# Patient Record
Sex: MALE | Race: BLACK OR AFRICAN AMERICAN | Employment: FULL TIME | URBAN - METROPOLITAN AREA
[De-identification: names, ages, dates, MRNs, and addresses within clinical notes are randomized per-mention and may not be internally consistent; named-entity substitution may affect disease eponyms.]

---

## 2017-06-28 ENCOUNTER — HOSPITAL ENCOUNTER (EMERGENCY)
Age: 52
Discharge: HOME | End: 2017-06-28
Attending: EMERGENCY MEDICINE
Payer: SELF-PAY

## 2017-06-28 VITALS
TEMPERATURE: 97.7 F | HEART RATE: 82 BPM | DIASTOLIC BLOOD PRESSURE: 104 MMHG | RESPIRATION RATE: 20 BRPM | HEIGHT: 69 IN | BODY MASS INDEX: 33.47 KG/M2 | WEIGHT: 226 LBS | OXYGEN SATURATION: 97 % | SYSTOLIC BLOOD PRESSURE: 158 MMHG

## 2017-06-28 DIAGNOSIS — I10 ESSENTIAL HYPERTENSION: Primary | ICD-10-CM

## 2017-06-28 PROCEDURE — 74011250637 HC RX REV CODE- 250/637: Performed by: NURSE PRACTITIONER

## 2017-06-28 PROCEDURE — 99283 EMERGENCY DEPT VISIT LOW MDM: CPT

## 2017-06-28 RX ORDER — LISINOPRIL 10 MG/1
10 TABLET ORAL
Status: COMPLETED | OUTPATIENT
Start: 2017-06-28 | End: 2017-06-28

## 2017-06-28 RX ADMIN — LISINOPRIL 10 MG: 10 TABLET ORAL at 15:41

## 2017-06-28 NOTE — ED NOTES
Bedside and Verbal shift change report given to Melissa Neely RN (oncoming nurse) by Luís Isbell RN (offgoing nurse). Report given with SBAR, Kardex, Intake/Output, MAR and Recent Results.

## 2017-06-28 NOTE — DISCHARGE INSTRUCTIONS
We hope that we have addressed all of your medical concerns. The examination and treatment you received in the Emergency Department were for an emergent problem and were not intended as complete care. It is important that you follow up with your healthcare provider(s) for ongoing care. If your symptoms worsen or do not improve as expected, and you are unable to reach your usual health care provider(s), you should return to the Emergency Department. Today's healthcare is undergoing tremendous change, and patient satisfaction surveys are one of the many tools to assess the quality of medical care. You may receive a survey from the [facility] regarding your experience in the Emergency Department. I hope that your experience has been completely positive, particularly the medical care that I provided. As such, please participate in the survey; anything less than excellent does not meet my expectations or intentions. [facility] and 8 [facility] participate in nationally recognized quality of care measures. If your blood pressure is greater than 120/80, as reported below, we urge that you seek medical care to address the potential of high blood pressure, commonly known as hypertension. Hypertension can be hereditary or can be caused by certain medical conditions, pain, stress, or \"white coat syndrome. \"       Please make an appointment with your health care provider(s) for follow up of your Emergency Department visit. VITALS:   Patient Vitals for the past 8 hrs:   Temp Pulse Resp BP SpO2   06/28/17 1615 - - - (!) 158/104 97 %   06/28/17 1533 - 82 - (!) 168/102 98 %   06/28/17 1508 97.7 °F (36.5 °C) 82 20 (!) 196/116 96 %          Thank you for allowing us to provide you with medical care today. We realize that you have many choices for your emergency care needs. Please choose us in the future for any continued health care needs. Emily Myers, [address]: [phone]            No results found for this or any previous visit (from the past 24 hour(s)). No results found. High Blood Pressure: Care Instructions  Your Care Instructions  If your blood pressure is usually above 140/90, you have high blood pressure, or hypertension. That means the top number is 140 or higher or the bottom number is 90 or higher, or both. Despite what a lot of people think, high blood pressure usually doesn't cause headaches or make you feel dizzy or lightheaded. It usually has no symptoms. But it does increase your risk for heart attack, stroke, and kidney or eye damage. The higher your blood pressure, the more your risk increases. Your doctor will give you a goal for your blood pressure. Your goal will be based on your health and your age. An example of a goal is to keep your blood pressure below 140/90. Lifestyle changes, such as eating healthy and being active, are always important to help lower blood pressure. You might also take medicine to reach your blood pressure goal.  Follow-up care is a key part of your treatment and safety. Be sure to make and go to all appointments, and call your doctor if you are having problems. It's also a good idea to know your test results and keep a list of the medicines you take. How can you care for yourself at home? Medical treatment  · If you stop taking your medicine, your blood pressure will go back up. You may take one or more types of medicine to lower your blood pressure. Be safe with medicines. Take your medicine exactly as prescribed. Call your doctor if you think you are having a problem with your medicine. · Talk to your doctor before you start taking aspirin every day. Aspirin can help certain people lower their risk of a heart attack or stroke.  But taking aspirin isn't right for everyone, because it can cause serious bleeding. · See your doctor regularly. You may need to see the doctor more often at first or until your blood pressure comes down. · If you are taking blood pressure medicine, talk to your doctor before you take decongestants or anti-inflammatory medicine, such as ibuprofen. Some of these medicines can raise blood pressure. · Learn how to check your blood pressure at home. Lifestyle changes  · Stay at a healthy weight. This is especially important if you put on weight around the waist. Losing even 10 pounds can help you lower your blood pressure. · If your doctor recommends it, get more exercise. Walking is a good choice. Bit by bit, increase the amount you walk every day. Try for at least 30 minutes on most days of the week. You also may want to swim, bike, or do other activities. · Avoid or limit alcohol. Talk to your doctor about whether you can drink any alcohol. · Try to limit how much sodium you eat to less than 2,300 milligrams (mg) a day. Your doctor may ask you to try to eat less than 1,500 mg a day. · Eat plenty of fruits (such as bananas and oranges), vegetables, legumes, whole grains, and low-fat dairy products. · Lower the amount of saturated fat in your diet. Saturated fat is found in animal products such as milk, cheese, and meat. Limiting these foods may help you lose weight and also lower your risk for heart disease. · Do not smoke. Smoking increases your risk for heart attack and stroke. If you need help quitting, talk to your doctor about stop-smoking programs and medicines. These can increase your chances of quitting for good. When should you call for help? Call 911 anytime you think you may need emergency care. This may mean having symptoms that suggest that your blood pressure is causing a serious heart or blood vessel problem. Your blood pressure may be over 180/110. For example, call 911 if:  · You have symptoms of a heart attack.  These may include:  ¨ Chest pain or pressure, or a strange feeling in the chest.  ¨ Sweating. ¨ Shortness of breath. ¨ Nausea or vomiting. ¨ Pain, pressure, or a strange feeling in the back, neck, jaw, or upper belly or in one or both shoulders or arms. ¨ Lightheadedness or sudden weakness. ¨ A fast or irregular heartbeat. · You have symptoms of a stroke. These may include:  ¨ Sudden numbness, tingling, weakness, or loss of movement in your face, arm, or leg, especially on only one side of your body. ¨ Sudden vision changes. ¨ Sudden trouble speaking. ¨ Sudden confusion or trouble understanding simple statements. ¨ Sudden problems with walking or balance. ¨ A sudden, severe headache that is different from past headaches. · You have severe back or belly pain. Do not wait until your blood pressure comes down on its own. Get help right away. Call your doctor now or seek immediate care if:  · Your blood pressure is much higher than normal (such as 180/110 or higher), but you don't have symptoms. · You think high blood pressure is causing symptoms, such as:  ¨ Severe headache. ¨ Blurry vision. Watch closely for changes in your health, and be sure to contact your doctor if:  · Your blood pressure measures 140/90 or higher at least 2 times. That means the top number is 140 or higher or the bottom number is 90 or higher, or both. · You think you may be having side effects from your blood pressure medicine. · Your blood pressure is usually normal, but it goes above normal at least 2 times. Where can you learn more? Go to http://lin-eli.info/. Enter F926 in the search box to learn more about \"High Blood Pressure: Care Instructions. \"  Current as of: August 8, 2016  Content Version: 11.3  © 1060-8655 [employer]). Care instructions adapted under license by [employer] (which disclaims liability or warranty for this information).  If you have questions about a medical condition or this instruction, always ask your healthcare professional. [place] [address] any warranty or liability for your use of this information.

## 2017-06-28 NOTE — ED TRIAGE NOTES
Pt arrives from his pain management physician for hypertension. Pt reports his pressure was 210/120 at appointment. /116 in triage.

## 2017-06-28 NOTE — ED PROVIDER NOTES
HPI Comments: Mercedez Anderson is a 46 y.o. male who presents ambulatory to the ED from his pain management clinic. He was sent to the emergency room due to his \"blood pressure being too high. \" Patient states he did not take his am blood pressure medication because he \"forgot. \" States he has pain down his leg like \"he normally does. \" 4/10. Patient denies headache, denies nausea, vomiting, fever chills. Patient asymptomatic regarding hypertension. PCP: None    PMHx significant for: Past Medical History:  No date: Hypertension    PSHx significant for: Past Surgical History:  No date: HX APPENDECTOMY    Social Hx: Tobacco: current everyday smoker EtOH: occasional Illicit drug use: none    There are no further complaints or symptoms at this time. The history is provided by the patient. Past Medical History:   Diagnosis Date    Hypertension        Past Surgical History:   Procedure Laterality Date    HX APPENDECTOMY           History reviewed. No pertinent family history. Social History     Social History    Marital status: SINGLE     Spouse name: N/A    Number of children: N/A    Years of education: N/A     Occupational History    Not on file. Social History Main Topics    Smoking status: Current Every Day Smoker     Packs/day: 1.00    Smokeless tobacco: Not on file    Alcohol use Yes      Comment: occ    Drug use: No    Sexual activity: Not on file     Other Topics Concern    Not on file     Social History Narrative         ALLERGIES: Review of patient's allergies indicates no known allergies. Review of Systems   Constitutional: Negative for activity change, appetite change, chills, fatigue and fever. HENT: Negative for congestion, ear pain, sinus pressure, sore throat, trouble swallowing and voice change. Eyes: Negative for photophobia, pain, redness and visual disturbance. Respiratory: Negative for chest tightness, shortness of breath and wheezing.     Cardiovascular: Negative for chest pain and palpitations. Gastrointestinal: Negative for abdominal distention, abdominal pain, nausea and vomiting. Endocrine: Negative. Genitourinary: Negative for difficulty urinating, frequency and urgency. Musculoskeletal: Positive for back pain (left hand side running down into his leg chronic pain). Negative for neck pain and neck stiffness. Skin: Negative for color change, rash and wound. Allergic/Immunologic: Negative. Neurological: Negative for dizziness, syncope, weakness and headaches. Hematological: Does not bruise/bleed easily. Psychiatric/Behavioral: Negative for behavioral problems. The patient is not nervous/anxious. Vitals:    06/28/17 1508   BP: (!) 196/116   Pulse: 82   Resp: 20   Temp: 97.7 °F (36.5 °C)   SpO2: 96%   Weight: 102.5 kg (226 lb)   Height: 5' 9\" (1.753 m)            Physical Exam   Constitutional: He is oriented to person, place, and time. He appears well-developed and well-nourished. No distress. HENT:   Head: Normocephalic and atraumatic. Right Ear: External ear normal.   Left Ear: External ear normal.   Nose: Nose normal.   Mouth/Throat: Oropharynx is clear and moist.   Eyes: Conjunctivae and EOM are normal. Pupils are equal, round, and reactive to light. Right eye exhibits no discharge. Left eye exhibits no discharge. Neck: Normal range of motion. Neck supple. No JVD present. Cardiovascular: Normal rate, regular rhythm, normal heart sounds and intact distal pulses. No murmur heard. Pulmonary/Chest: Effort normal and breath sounds normal. No respiratory distress. He exhibits no tenderness. Abdominal: Soft. Bowel sounds are normal. He exhibits no distension. There is no tenderness. Genitourinary:   Genitourinary Comments: Voids without difficulty   Musculoskeletal: Normal range of motion. He exhibits no edema or deformity. Neurological: He is alert and oriented to person, place, and time. Skin: Skin is warm and dry. Psychiatric: He has a normal mood and affect. His behavior is normal. Judgment and thought content normal.   Nursing note and vitals reviewed. MDM  Number of Diagnoses or Management Options  Essential hypertension: established and improving  Diagnosis management comments: Discharge to home and follow up with PCP.        Amount and/or Complexity of Data Reviewed  Discuss the patient with other providers: yes (Discussed plan of care with Dr. Kimberly Sy.)      ED Course       Procedures

## 2021-03-22 ENCOUNTER — HOSPITAL ENCOUNTER (EMERGENCY)
Age: 56
Discharge: HOME | End: 2021-03-22
Attending: EMERGENCY MEDICINE

## 2021-03-22 ENCOUNTER — APPOINTMENT (OUTPATIENT)
Dept: GENERAL RADIOLOGY | Age: 56
End: 2021-03-22
Attending: EMERGENCY MEDICINE

## 2021-03-22 VITALS
HEIGHT: 69 IN | SYSTOLIC BLOOD PRESSURE: 145 MMHG | HEART RATE: 89 BPM | OXYGEN SATURATION: 95 % | TEMPERATURE: 98.2 F | BODY MASS INDEX: 39.18 KG/M2 | WEIGHT: 264.55 LBS | DIASTOLIC BLOOD PRESSURE: 93 MMHG | RESPIRATION RATE: 21 BRPM

## 2021-03-22 DIAGNOSIS — M10.9 ACUTE GOUT INVOLVING TOE OF LEFT FOOT, UNSPECIFIED CAUSE: Primary | ICD-10-CM

## 2021-03-22 PROCEDURE — 99283 EMERGENCY DEPT VISIT LOW MDM: CPT

## 2021-03-22 PROCEDURE — 73660 X-RAY EXAM OF TOE(S): CPT

## 2021-03-22 PROCEDURE — 74011250637 HC RX REV CODE- 250/637: Performed by: EMERGENCY MEDICINE

## 2021-03-22 RX ORDER — COLCHICINE 0.6 MG/1
0.6 TABLET ORAL
Status: COMPLETED | OUTPATIENT
Start: 2021-03-22 | End: 2021-03-22

## 2021-03-22 RX ORDER — COLCHICINE 0.6 MG/1
0.6 TABLET ORAL DAILY
Qty: 1 TAB | Refills: 0 | Status: SHIPPED | OUTPATIENT
Start: 2021-03-22 | End: 2021-03-23

## 2021-03-22 RX ORDER — HYDROCHLOROTHIAZIDE 25 MG/1
TABLET ORAL DAILY
COMMUNITY

## 2021-03-22 RX ADMIN — COLCHICINE 0.6 MG: 0.6 TABLET, FILM COATED ORAL at 16:43

## 2021-03-22 NOTE — ED TRIAGE NOTES
TRIAGE NOTE: Patient arrived from home with c/o LEFT big toe pain after stubbing his foot 3 weeks ago. \"it just hasnt gotten better, I was worried about it. \"

## 2021-03-22 NOTE — ED PROVIDER NOTES
The history is provided by the patient. Foot Pain   This is a new problem. The current episode started more than 1 week ago. The problem occurs constantly. The problem has not changed since onset. The pain is present in the left toes. The quality of the pain is described as aching. The pain is moderate. Associated symptoms include limited range of motion. Pertinent negatives include no numbness, no stiffness, no tingling, no itching, no back pain and no neck pain. The symptoms are aggravated by standing, contact, movement and palpation. He has tried nothing for the symptoms. The treatment provided no relief. There has been no history of extremity trauma. Past Medical History:   Diagnosis Date    Hypertension        Past Surgical History:   Procedure Laterality Date    HX APPENDECTOMY           History reviewed. No pertinent family history.     Social History     Socioeconomic History    Marital status: SINGLE     Spouse name: Not on file    Number of children: Not on file    Years of education: Not on file    Highest education level: Not on file   Occupational History    Not on file   Social Needs    Financial resource strain: Not on file    Food insecurity     Worry: Not on file     Inability: Not on file    Transportation needs     Medical: Not on file     Non-medical: Not on file   Tobacco Use    Smoking status: Current Every Day Smoker     Packs/day: 1.00   Substance and Sexual Activity    Alcohol use: Yes     Comment: occ    Drug use: No    Sexual activity: Not on file   Lifestyle    Physical activity     Days per week: Not on file     Minutes per session: Not on file    Stress: Not on file   Relationships    Social connections     Talks on phone: Not on file     Gets together: Not on file     Attends Sabianism service: Not on file     Active member of club or organization: Not on file     Attends meetings of clubs or organizations: Not on file     Relationship status: Not on file   Nathaly Intimate partner violence     Fear of current or ex partner: Not on file     Emotionally abused: Not on file     Physically abused: Not on file     Forced sexual activity: Not on file   Other Topics Concern    Not on file   Social History Narrative    Not on file         ALLERGIES: Patient has no known allergies. Review of Systems   Constitutional: Negative for activity change, chills and fever. HENT: Negative for nosebleeds, sore throat, trouble swallowing and voice change. Eyes: Negative for visual disturbance. Respiratory: Negative for shortness of breath. Cardiovascular: Negative for chest pain and palpitations. Gastrointestinal: Negative for abdominal pain, constipation, diarrhea and nausea. Genitourinary: Negative for difficulty urinating, dysuria, hematuria and urgency. Musculoskeletal: Positive for arthralgias. Negative for back pain, neck pain, neck stiffness and stiffness. Skin: Negative for color change and itching. Allergic/Immunologic: Negative for immunocompromised state. Neurological: Negative for dizziness, tingling, seizures, syncope, weakness, light-headedness, numbness and headaches. Psychiatric/Behavioral: Negative for behavioral problems, confusion, hallucinations, self-injury and suicidal ideas. Vitals:    03/22/21 1620 03/22/21 1630 03/22/21 1700   BP: (!) 162/100 (!) 132/90 (!) 145/93   Pulse: 100  89   Resp: 17  21   Temp: 98.2 °F (36.8 °C)     SpO2: 95%  95%   Weight: 120 kg (264 lb 8.8 oz)     Height: 5' 9\" (1.753 m)              Physical Exam  Vitals signs and nursing note reviewed. Constitutional:       General: He is not in acute distress. Appearance: He is well-developed. He is not diaphoretic. HENT:      Head: Atraumatic. Neck:      Trachea: No tracheal deviation. Cardiovascular:      Comments: Warm and well perfused  Pulmonary:      Effort: Pulmonary effort is normal. No respiratory distress.    Musculoskeletal:        Feet:    Skin: General: Skin is warm and dry. Neurological:      Mental Status: He is alert. Coordination: Coordination normal.   Psychiatric:         Behavior: Behavior normal.         Thought Content: Thought content normal.         Judgment: Judgment normal.          MDM     This is a 40-year-old male with past medical history, review of systems, physical exam as above, presenting with complaints of left great toe pain. Patient states pain began approximately 3 weeks ago. He endorses stubbing his toe prior to onset of pain, and also states his daughter \"stepped on my foot with a heavy shoes\". He denies other injury, fevers or chills. Physical exam is remarkable for well-appearing middle-aged male, in no acute distress, with tenderness to palpation over first MTP of the left foot, without swelling or erythema. Patient endorses previous history of gout. Suspect gout. Plan to provide colchicine and obtain plain films, disposition pending. Procedures    Update:  Unremarkable plain films, likely gouty arthritis. Plan to discharge home with course of colchicine, recommend primary care follow-up, return precautions given.

## 2021-11-23 ENCOUNTER — APPOINTMENT (OUTPATIENT)
Dept: CT IMAGING | Age: 56
End: 2021-11-23
Attending: EMERGENCY MEDICINE
Payer: MEDICARE

## 2021-11-23 ENCOUNTER — APPOINTMENT (OUTPATIENT)
Dept: GENERAL RADIOLOGY | Age: 56
End: 2021-11-23
Attending: EMERGENCY MEDICINE
Payer: MEDICARE

## 2021-11-23 ENCOUNTER — HOSPITAL ENCOUNTER (EMERGENCY)
Age: 56
Discharge: HOME | End: 2021-11-23
Attending: EMERGENCY MEDICINE
Payer: MEDICARE

## 2021-11-23 VITALS
WEIGHT: 246 LBS | HEART RATE: 66 BPM | SYSTOLIC BLOOD PRESSURE: 134 MMHG | TEMPERATURE: 98.5 F | BODY MASS INDEX: 36.43 KG/M2 | DIASTOLIC BLOOD PRESSURE: 89 MMHG | HEIGHT: 69 IN | OXYGEN SATURATION: 92 % | RESPIRATION RATE: 20 BRPM

## 2021-11-23 DIAGNOSIS — J44.1 CHRONIC OBSTRUCTIVE PULMONARY DISEASE WITH ACUTE EXACERBATION (HCC): Primary | ICD-10-CM

## 2021-11-23 LAB
ALBUMIN SERPL-MCNC: 3.5 G/DL (ref 3.5–5)
ALBUMIN/GLOB SERPL: 0.8 {RATIO} (ref 1.1–2.2)
ALP SERPL-CCNC: 116 U/L (ref 45–117)
ALT SERPL-CCNC: 33 U/L (ref 12–78)
ANION GAP SERPL CALC-SCNC: 11 MMOL/L (ref 5–15)
AST SERPL-CCNC: 20 U/L (ref 15–37)
ATRIAL RATE: 94 BPM
BASOPHILS # BLD: 0 K/UL (ref 0–0.1)
BASOPHILS NFR BLD: 1 % (ref 0–1)
BILIRUB SERPL-MCNC: 0.3 MG/DL (ref 0.2–1)
BUN SERPL-MCNC: 12 MG/DL (ref 6–20)
BUN/CREAT SERPL: 7 (ref 12–20)
CALCIUM SERPL-MCNC: 9.3 MG/DL (ref 8.5–10.1)
CALCULATED P AXIS, ECG09: 66 DEGREES
CALCULATED R AXIS, ECG10: 11 DEGREES
CALCULATED T AXIS, ECG11: 65 DEGREES
CHLORIDE SERPL-SCNC: 105 MMOL/L (ref 97–108)
CO2 SERPL-SCNC: 29 MMOL/L (ref 21–32)
CREAT SERPL-MCNC: 1.65 MG/DL (ref 0.7–1.3)
D DIMER PPP FEU-MCNC: 0.4 MG/L FEU (ref 0–0.65)
DIAGNOSIS, 93000: NORMAL
DIFFERENTIAL METHOD BLD: ABNORMAL
EOSINOPHIL # BLD: 0.2 K/UL (ref 0–0.4)
EOSINOPHIL NFR BLD: 3 % (ref 0–7)
ERYTHROCYTE [DISTWIDTH] IN BLOOD BY AUTOMATED COUNT: 13.3 % (ref 11.5–14.5)
GLOBULIN SER CALC-MCNC: 4.4 G/DL (ref 2–4)
GLUCOSE SERPL-MCNC: 120 MG/DL (ref 65–100)
HCT VFR BLD AUTO: 47.2 % (ref 36.6–50.3)
HGB BLD-MCNC: 14.9 G/DL (ref 12.1–17)
IMM GRANULOCYTES # BLD AUTO: 0 K/UL (ref 0–0.04)
IMM GRANULOCYTES NFR BLD AUTO: 0 % (ref 0–0.5)
LYMPHOCYTES # BLD: 2.4 K/UL (ref 0.8–3.5)
LYMPHOCYTES NFR BLD: 34 % (ref 12–49)
MCH RBC QN AUTO: 33 PG (ref 26–34)
MCHC RBC AUTO-ENTMCNC: 31.6 G/DL (ref 30–36.5)
MCV RBC AUTO: 104.4 FL (ref 80–99)
MONOCYTES # BLD: 0.9 K/UL (ref 0–1)
MONOCYTES NFR BLD: 12 % (ref 5–13)
NEUTS SEG # BLD: 3.6 K/UL (ref 1.8–8)
NEUTS SEG NFR BLD: 50 % (ref 32–75)
NRBC # BLD: 0 K/UL (ref 0–0.01)
NRBC BLD-RTO: 0 PER 100 WBC
P-R INTERVAL, ECG05: 138 MS
PLATELET # BLD AUTO: 253 K/UL (ref 150–400)
PMV BLD AUTO: 9.2 FL (ref 8.9–12.9)
POTASSIUM SERPL-SCNC: 4.2 MMOL/L (ref 3.5–5.1)
PROT SERPL-MCNC: 7.9 G/DL (ref 6.4–8.2)
Q-T INTERVAL, ECG07: 360 MS
QRS DURATION, ECG06: 98 MS
QTC CALCULATION (BEZET), ECG08: 450 MS
RBC # BLD AUTO: 4.52 M/UL (ref 4.1–5.7)
SODIUM SERPL-SCNC: 145 MMOL/L (ref 136–145)
TROPONIN-HIGH SENSITIVITY: 10 NG/L (ref 0–76)
TROPONIN-HIGH SENSITIVITY: 7 NG/L (ref 0–76)
VENTRICULAR RATE, ECG03: 94 BPM
WBC # BLD AUTO: 7.2 K/UL (ref 4.1–11.1)

## 2021-11-23 PROCEDURE — 84484 ASSAY OF TROPONIN QUANT: CPT

## 2021-11-23 PROCEDURE — 99285 EMERGENCY DEPT VISIT HI MDM: CPT

## 2021-11-23 PROCEDURE — 74011000636 HC RX REV CODE- 636: Performed by: EMERGENCY MEDICINE

## 2021-11-23 PROCEDURE — 96374 THER/PROPH/DIAG INJ IV PUSH: CPT

## 2021-11-23 PROCEDURE — 93005 ELECTROCARDIOGRAM TRACING: CPT

## 2021-11-23 PROCEDURE — 80053 COMPREHEN METABOLIC PANEL: CPT

## 2021-11-23 PROCEDURE — 85379 FIBRIN DEGRADATION QUANT: CPT

## 2021-11-23 PROCEDURE — 71275 CT ANGIOGRAPHY CHEST: CPT

## 2021-11-23 PROCEDURE — 71046 X-RAY EXAM CHEST 2 VIEWS: CPT

## 2021-11-23 PROCEDURE — 74011250637 HC RX REV CODE- 250/637: Performed by: EMERGENCY MEDICINE

## 2021-11-23 PROCEDURE — 85025 COMPLETE CBC W/AUTO DIFF WBC: CPT

## 2021-11-23 PROCEDURE — 74011250636 HC RX REV CODE- 250/636: Performed by: EMERGENCY MEDICINE

## 2021-11-23 RX ORDER — ACETAMINOPHEN 325 MG/1
650 TABLET ORAL
Status: COMPLETED | OUTPATIENT
Start: 2021-11-23 | End: 2021-11-23

## 2021-11-23 RX ORDER — DOXYCYCLINE HYCLATE 100 MG
100 TABLET ORAL 2 TIMES DAILY
Qty: 14 TABLET | Refills: 0 | Status: SHIPPED | OUTPATIENT
Start: 2021-11-23 | End: 2021-11-30

## 2021-11-23 RX ORDER — IPRATROPIUM BROMIDE AND ALBUTEROL SULFATE 2.5; .5 MG/3ML; MG/3ML
3 SOLUTION RESPIRATORY (INHALATION)
Status: DISCONTINUED | OUTPATIENT
Start: 2021-11-23 | End: 2021-11-23

## 2021-11-23 RX ORDER — DOXYCYCLINE HYCLATE 100 MG
100 TABLET ORAL ONCE
Status: COMPLETED | OUTPATIENT
Start: 2021-11-23 | End: 2021-11-23

## 2021-11-23 RX ORDER — ALBUTEROL SULFATE 90 UG/1
4-6 AEROSOL, METERED RESPIRATORY (INHALATION)
Qty: 18 G | Refills: 0 | Status: SHIPPED | OUTPATIENT
Start: 2021-11-23

## 2021-11-23 RX ORDER — ALBUTEROL SULFATE 90 UG/1
6 AEROSOL, METERED RESPIRATORY (INHALATION) ONCE
Status: COMPLETED | OUTPATIENT
Start: 2021-11-23 | End: 2021-11-23

## 2021-11-23 RX ORDER — PREDNISONE 20 MG/1
60 TABLET ORAL DAILY
Qty: 12 TABLET | Refills: 0 | Status: SHIPPED | OUTPATIENT
Start: 2021-11-23 | End: 2021-11-27

## 2021-11-23 RX ORDER — ALBUTEROL SULFATE 0.83 MG/ML
2.5 SOLUTION RESPIRATORY (INHALATION)
Status: DISCONTINUED | OUTPATIENT
Start: 2021-11-23 | End: 2021-11-23

## 2021-11-23 RX ADMIN — ALBUTEROL SULFATE 6 PUFF: 90 AEROSOL, METERED RESPIRATORY (INHALATION) at 02:35

## 2021-11-23 RX ADMIN — IOPAMIDOL 100 ML: 755 INJECTION, SOLUTION INTRAVENOUS at 02:04

## 2021-11-23 RX ADMIN — ACETAMINOPHEN 650 MG: 325 TABLET ORAL at 01:03

## 2021-11-23 RX ADMIN — DOXYCYCLINE HYCLATE 100 MG: 100 TABLET, COATED ORAL at 02:30

## 2021-11-23 RX ADMIN — METHYLPREDNISOLONE SODIUM SUCCINATE 125 MG: 125 INJECTION, POWDER, FOR SOLUTION INTRAMUSCULAR; INTRAVENOUS at 02:32

## 2021-11-23 NOTE — ED TRIAGE NOTES
Pt arrives with c/o intermittent sharp L lateral chest pain x 6 hours. Pt denies pain radiating, sweating or nausea. States also has dry unproductive cough x 3 days.

## 2021-11-23 NOTE — ED NOTES
Discharge note: The patient was discharged home in stable condition. The patient is alert and oriented, is in no respiratory distress. The patient's diagnosis, condition and treatment were explained to patient by Dr Barry Schwarz and reinforced by nurse. The patient expressed understanding of discharge instructions, prescriptions, and plan of care. A discharge plan has been developed. A  was not involved in the process. Patient offered a wheelchair to ED lobby for discharge but declined at this time. Patient ambulatory to ED lobby to go home.

## 2021-11-23 NOTE — ED PROVIDER NOTES
HPI     59-year-old male with a history of hypertension, smoker, presents emergency department complaining of left-sided chest spasm associated with a cough. He states his cough is slightly productive. Denies fever or shortness of breath. He states it started last night. He states his daughter was sick with an upper respiratory tract infection recently but tested negative for Covid x4. He states he has been fully vaccinated. He denies a history of heart disease, diabetes, high cholesterol, or family history of heart disease. He reports left leg pain which is chronic and related to his back pain. He denies any recent travel or history of DVT PE. He states his pain is not worse when he takes a deep breath. He rates it as a 2 out of 10. He states when he coughs it causes it to spasm then he has residual dull ache. He states sometimes it is also worse with movement of his chest.    Past Medical History:   Diagnosis Date    Hypertension        Past Surgical History:   Procedure Laterality Date    HX APPENDECTOMY           No family history on file.     Social History     Socioeconomic History    Marital status: SINGLE     Spouse name: Not on file    Number of children: Not on file    Years of education: Not on file    Highest education level: Not on file   Occupational History    Not on file   Tobacco Use    Smoking status: Current Every Day Smoker     Packs/day: 1.00    Smokeless tobacco: Not on file   Substance and Sexual Activity    Alcohol use: Yes     Comment: occ    Drug use: No    Sexual activity: Not on file   Other Topics Concern    Not on file   Social History Narrative    Not on file     Social Determinants of Health     Financial Resource Strain:     Difficulty of Paying Living Expenses: Not on file   Food Insecurity:     Worried About Running Out of Food in the Last Year: Not on file    Brandie of Food in the Last Year: Not on file   Transportation Needs:     Lack of Transportation (Medical): Not on file    Lack of Transportation (Non-Medical): Not on file   Physical Activity:     Days of Exercise per Week: Not on file    Minutes of Exercise per Session: Not on file   Stress:     Feeling of Stress : Not on file   Social Connections:     Frequency of Communication with Friends and Family: Not on file    Frequency of Social Gatherings with Friends and Family: Not on file    Attends Muslim Services: Not on file    Active Member of [address] [place] [address] or Organizations: Not on file    Attends Club or Organization Meetings: Not on file    Marital Status: Not on file   Intimate Partner Violence:     Fear of Current or Ex-Partner: Not on file    Emotionally Abused: Not on file    Physically Abused: Not on file    Sexually Abused: Not on file   Housing Stability:     Unable to Pay for Housing in the Last Year: Not on file    Number of [place] in the Last Year: Not on file    Unstable Housing in the Last Year: Not on file         ALLERGIES: Patient has no known allergies. Review of Systems   Constitutional: Negative for fatigue. HENT: Positive for congestion. Eyes: Negative for visual disturbance. Respiratory: Positive for cough. Negative for shortness of breath. Cardiovascular: Positive for chest pain. Negative for palpitations and leg swelling. Gastrointestinal: Negative for abdominal pain, nausea and vomiting. Endocrine: Negative for polyuria. Genitourinary: Negative for dysuria. Musculoskeletal: Negative for gait problem. Skin: Negative for rash. Neurological: Negative for headaches. Psychiatric/Behavioral: Negative for dysphoric mood. Vitals:    11/23/21 0017   BP: (!) 151/101   Pulse: (!) 104   Resp: 16   Temp: 98.5 °F (36.9 °C)   SpO2: 95%   Weight: 111.6 kg (246 lb)   Height: 5' 9\" (1.753 m)            Physical Exam  Constitutional:       General: He is not in acute distress. Appearance: He is well-developed.    HENT:      Head: Normocephalic and atraumatic. Mouth/Throat:      Pharynx: No oropharyngeal exudate. Eyes:      General: No scleral icterus. Right eye: No discharge. Left eye: No discharge. Pupils: Pupils are equal, round, and reactive to light. Neck:      Vascular: No JVD. Cardiovascular:      Rate and Rhythm: Normal rate and regular rhythm. Heart sounds: Normal heart sounds. No murmur heard. Pulmonary:      Effort: Pulmonary effort is normal. No respiratory distress. Breath sounds: Normal breath sounds. No stridor. No wheezing or rales. Chest:      Chest wall: Tenderness present. Abdominal:      General: Bowel sounds are normal. There is no distension. Palpations: Abdomen is soft. There is no mass. Tenderness: There is no abdominal tenderness. There is no guarding or rebound. Musculoskeletal:         General: Normal range of motion. Cervical back: Normal range of motion and neck supple. Skin:     General: Skin is warm and dry. Capillary Refill: Capillary refill takes less than 2 seconds. Findings: No rash. Neurological:      Mental Status: He is oriented to person, place, and time. Psychiatric:         Behavior: Behavior normal.         Thought Content: Thought content normal.         Judgment: Judgment normal.          MDM       Procedures    ED EKG interpretation:  Rhythm: normal sinus rhythm; and regular . Rate (approx.): 94; Axis: normal; P wave: normal; QRS interval: normal ; ST/T wave: non-specific changes; i This EKG was interpreted by Noreen Spear MD,ED Provider. CXR shows ? Bleb v- pneumothorax on right. Left lung is clear. Pulse ox 89%. Re-exam patient has diffuse wheeze. Will give neb. Initial troponin 7- will get 2 hour level. CTA pending. Repeat troponin is delayed due to lab error. Patient aware and is resting comfortably.   When he is sleeping he desaturates, but when awake and talking pulse ox is around 94% and he is in no distress. If repeat troponin ok, will d/c with inhaler, steroids, doxy and close follow up with pcp. Return precautions provided. Repeat troponin is still low. My clinical suspician for cad is low. Presentation consistent with copd exacerbation.

## 2021-11-23 NOTE — DISCHARGE INSTRUCTIONS
Work up in the emergency room tonight has revealed evidence of COPD or emphysema. No pneumonia or sign of heart disease. We are treating you with an inhaler (you can take 4-6 puffs every 4 hours as needed for shortness of breath/cough/wheezing), antibiotics (doxycycline for 7 days) and Prednisone (a steroid that helps with inflammation in the lungs)  It's important you follow up closely with your primary care doctor in follow up from this ED visit. If you feel your symptoms/condition is getting worse despite the medications you are starting tonight, especially if you get worsening chest pain, shortness of breath, fevers, nausea/vomiting, etc. Return here.
no

## 2023-05-16 ENCOUNTER — ANESTHESIA EVENT (OUTPATIENT)
Facility: HOSPITAL | Age: 58
End: 2023-05-16
Payer: MEDICARE

## 2023-05-16 ENCOUNTER — ANESTHESIA (OUTPATIENT)
Facility: HOSPITAL | Age: 58
End: 2023-05-16
Payer: MEDICARE

## 2023-05-16 ENCOUNTER — HOSPITAL ENCOUNTER (OUTPATIENT)
Facility: HOSPITAL | Age: 58
Setting detail: OUTPATIENT SURGERY
Discharge: HOME | End: 2023-05-16
Attending: SPECIALIST | Admitting: SPECIALIST
Payer: MEDICARE

## 2023-05-16 VITALS
DIASTOLIC BLOOD PRESSURE: 93 MMHG | HEART RATE: 82 BPM | OXYGEN SATURATION: 91 % | RESPIRATION RATE: 24 BRPM | TEMPERATURE: 97.9 F | BODY MASS INDEX: 38.28 KG/M2 | WEIGHT: 267.42 LBS | SYSTOLIC BLOOD PRESSURE: 162 MMHG | HEIGHT: 70 IN

## 2023-05-16 PROCEDURE — 6360000002 HC RX W HCPCS: Performed by: NURSE ANESTHETIST, CERTIFIED REGISTERED

## 2023-05-16 PROCEDURE — 7100000010 HC PHASE II RECOVERY - FIRST 15 MIN: Performed by: SPECIALIST

## 2023-05-16 PROCEDURE — 3600007502: Performed by: SPECIALIST

## 2023-05-16 PROCEDURE — 3700000001 HC ADD 15 MINUTES (ANESTHESIA): Performed by: SPECIALIST

## 2023-05-16 PROCEDURE — 3700000000 HC ANESTHESIA ATTENDED CARE: Performed by: SPECIALIST

## 2023-05-16 PROCEDURE — 2709999900 HC NON-CHARGEABLE SUPPLY: Performed by: SPECIALIST

## 2023-05-16 PROCEDURE — 88305 TISSUE EXAM BY PATHOLOGIST: CPT

## 2023-05-16 PROCEDURE — 7100000011 HC PHASE II RECOVERY - ADDTL 15 MIN: Performed by: SPECIALIST

## 2023-05-16 PROCEDURE — 3600007512: Performed by: SPECIALIST

## 2023-05-16 PROCEDURE — 2580000003 HC RX 258: Performed by: SPECIALIST

## 2023-05-16 RX ORDER — SODIUM CHLORIDE 0.9 % (FLUSH) 0.9 %
5-40 SYRINGE (ML) INJECTION PRN
Status: DISCONTINUED | OUTPATIENT
Start: 2023-05-16 | End: 2023-05-16 | Stop reason: HOSPADM

## 2023-05-16 RX ORDER — PREDNISONE 20 MG/1
20 TABLET ORAL DAILY
COMMUNITY

## 2023-05-16 RX ORDER — ACETAMINOPHEN 500 MG
500 TABLET ORAL EVERY 6 HOURS PRN
COMMUNITY

## 2023-05-16 RX ORDER — SODIUM CHLORIDE 9 MG/ML
INJECTION, SOLUTION INTRAVENOUS CONTINUOUS
Status: DISCONTINUED | OUTPATIENT
Start: 2023-05-16 | End: 2023-05-16 | Stop reason: HOSPADM

## 2023-05-16 RX ORDER — AMLODIPINE BESYLATE 10 MG/1
10 TABLET ORAL DAILY
COMMUNITY

## 2023-05-16 RX ORDER — ALLOPURINOL 100 MG/1
100 TABLET ORAL DAILY
COMMUNITY

## 2023-05-16 RX ORDER — LIDOCAINE HYDROCHLORIDE 20 MG/ML
INJECTION, SOLUTION INTRAVENOUS PRN
Status: DISCONTINUED | OUTPATIENT
Start: 2023-05-16 | End: 2023-05-16 | Stop reason: SDUPTHER

## 2023-05-16 RX ORDER — NAPROXEN 375 MG/1
375 TABLET ORAL 2 TIMES DAILY WITH MEALS
COMMUNITY

## 2023-05-16 RX ADMIN — SODIUM CHLORIDE: 9 INJECTION, SOLUTION INTRAVENOUS at 10:20

## 2023-05-16 RX ADMIN — LIDOCAINE HYDROCHLORIDE 20 MG: 20 INJECTION, SOLUTION INTRAVENOUS at 10:39

## 2023-05-16 RX ADMIN — LIDOCAINE HYDROCHLORIDE 80 MG: 20 INJECTION, SOLUTION INTRAVENOUS at 10:35

## 2023-05-16 NOTE — PROGRESS NOTES
Sanjuana Pour  1965  [id number]    Situation:  Verbal report received from: Gaby FLORES  Procedure: Procedure(s):  COLONOSCOPY DIAGNOSTIC  COLONOSCOPY POLYPECTOMY SNARE/COLD BIOPSY    Background:    Preoperative diagnosis: Family history of colonic polyps [Z83.71]  Postoperative diagnosis: * No post-op diagnosis entered *    :  Dr. Bonnie Mendoza  Assistant(s): Circulator: Sotero Mayers RN  Endoscopy Technician: Rachel Hernandez    Specimens:   ID Type Source Tests Collected by Time Destination   A : Sigmoid polyp Tissue Sigmoid Colon SURGICAL PATHOLOGY Rosales Lewis MD 5/16/2023 1045      H. Pylori  No    Assessment:  Intra-procedure medications   Anesthesia gave intra-procedure sedation and medications, see anesthesia flow sheet Yes    Intravenous fluids: NS@ KVO     Vital signs stable yes    Abdominal assessment: round and soft yes    Recommendation:  Discharge patient per MD order yes.   Family or Friend family- brother in law  Permission to share finding with family or friend Yes

## 2023-05-16 NOTE — H&P
62 y.o. male for open access colonoscopy for screening   Additional data for completion of the targeted pre-endoscopy H&P will be provided under 'H&P interval notes'. Please see that document which will be attached to this.   Luz Maria Rachel MD

## 2023-05-16 NOTE — OP NOTE
Circumferential lesions would have been evident but polyps of even significant size might have been obscured by the residual.  Photodocumentation of prep accomplished. Small 5mm flat pale polyp in the sigmoid removed with cold snare, retrieved, and hemostasis confirmed. Specimens:    See above    Complications:   None; patient tolerated the procedure well. Impression:  Sigmoid polyp, poor prep. Recommendations:     - Await pathology. - Repeat colonoscopy in 6-12 months with a two day colonic preparation. Thank you for entrusting me with this patient's care. Please do not hesitate to contact me with any questions or if I can be of assistance with any of your other patients' GI needs. Signed By: Desi Rey MD                        May 16, 2023      Surgical assistant none. Implants none unless specified.

## 2023-05-16 NOTE — PROGRESS NOTES
Endoscopy discharge instructions have been reviewed and given to patient. The patient verbalized understanding and acceptance of instructions. Dr. Valentina Singer discussed with brother in law procedure findings and next steps.

## 2023-05-16 NOTE — H&P
Pre-Endoscopy H&P Update  Chief complaint/HPI/ROS:  The indication for the procedure, the patient's history and the patient's current medications are reviewed prior to the procedure and that data is reported on the H&P to which this document is attached. Any significant complaints with regard to organ systems will be noted, and if not mentioned then a review of systems is not contributory. Past Medical History:   Diagnosis Date    Hypertension       Past Surgical History:   Procedure Laterality Date    APPENDECTOMY       Social   Social History     Tobacco Use    Smoking status: Every Day     Packs/day: 1.00     Types: Cigarettes    Smokeless tobacco: Never   Substance Use Topics    Alcohol use: Yes      No family history on file. Allergies   Allergen Reactions    Lisinopril Itching and Cough      Prior to Admission Medications   Prescriptions Last Dose Informant Patient Reported? Taking?   acetaminophen (TYLENOL) 500 MG tablet Past Month  Yes Yes   Sig: Take 1 tablet by mouth every 6 hours as needed for Pain   allopurinol (ZYLOPRIM) 100 MG tablet Past Week  Yes Yes   Sig: Take 1 tablet by mouth daily   amLODIPine (NORVASC) 10 MG tablet 5/15/2023  Yes Yes   Sig: Take 1 tablet by mouth daily   naproxen (NAPROSYN) 375 MG tablet Past Week  Yes Yes   Sig: Take 1 tablet by mouth 2 times daily (with meals)   predniSONE (DELTASONE) 20 MG tablet Past Week  Yes Yes   Sig: Take 1 tablet by mouth daily      Facility-Administered Medications: None       PHYSICAL EXAM:  The patient is examined immediately prior to the procedure. Vitals:    05/16/23 1016   BP: (!) 174/90   Pulse: 91   Resp: 20   Temp: 98.6 °F (37 °C)   SpO2: 93%     Gen: Appears comfortable, no distress. Pulm: comfortable respirations with no abnormal audible breath sounds  HEART: well perfused, no abnormal audible heart sounds  GI: abdomen flat.     PLAN:  Informed consent discussion held, patient afforded an opportunity to ask questions and all questions

## 2023-05-16 NOTE — PERIOP NOTE
Report form TONO Allen, see anesthesia record. ABD remains soft and non-tender post procedure. Pt has no complaints at this time and tolerated the procedure well. Endoscope was pre-cleaned at bedside immediately following procedure by [facility].

## 2023-05-16 NOTE — ANESTHESIA POSTPROCEDURE EVALUATION
Department of Anesthesiology  Postprocedure Note    Patient: Nahed Egan  MRN: [id number]  YOB: 1965  Date of evaluation: 5/16/2023      Procedure Summary     Date: 05/16/23 Room / Location: [facility] ENDO 03 / [facility] ENDOSCOPY    Anesthesia Start: 1030 Anesthesia Stop: 1050    Procedures:       COLONOSCOPY DIAGNOSTIC (Lower GI Region)      COLONOSCOPY POLYPECTOMY SNARE/COLD BIOPSY (Lower GI Region) Diagnosis:       Family history of colonic polyps      (Family history of colonic polyps [Z83.71])    Surgeons: Romana Farrell MD Responsible Provider: Twan Honeycutt MD    Anesthesia Type: MAC ASA Status: 2          Anesthesia Type: No value filed.     Mi Phase I: Mi Score: 10    Mi Phase II: Mi Score: 10      Anesthesia Post Evaluation    Patient location during evaluation: bedside  Patient participation: complete - patient participated  Level of consciousness: awake  Airway patency: patent  Nausea & Vomiting: no vomiting and no nausea  Complications: no  Cardiovascular status: hemodynamically stable  Respiratory status: acceptable  Hydration status: stable

## 2023-05-16 NOTE — DISCHARGE INSTRUCTIONS
[address] [facility] JOHN Narvaez MD  [phone]      May 16, 2023    Nahed Egan  YOB: 1965    COLONOSCOPY DISCHARGE INSTRUCTIONS    If there is redness at IV site you should apply warm compress to area. If redness or soreness persist contact Dr. Patrick Narvaez' or your primary care doctor. There may be a slight amount of blood passed from the rectum. Gaseous discomfort may develop, but walking, belching will help relieve this. You may not operate a vehicle for 12 hours  You may not operate machinery or dangerous appliances for rest of today  You may not drink alcoholic beverages for 12 hours  Avoid making any critical decisions for 24 hours    DIET:  You may resume your normal diet, but some patients find that heavy or large meals may lead to indigestion or vomiting. I suggest a light meal as first food intake. MEDICATIONS:  The use of some over-the-counter pain medication may lead to bleeding after colon biopsies or polyp removal.  Tylenol (also called acetaminophen) is safe to take even if you have had colonoscopy with polyp removal.  Based on the procedure you had today you may not safely take aspirin or aspirin-like products for the next ten (10) days. Remember that Tylenol (also called acetaminophen) is safe to take after colonoscopy even if you have had biopsies or polyps removed. ACTIVITY:  You may resume your normal household activities, but it is recommended that you spend the remainder of the day resting -  avoid any strenuous activity. CALL DR. Nabor Ye' OFFICE IF:  Increasing pain, nausea, vomiting  Abdominal distension (swelling)  Significant new or increased bleeding (oral or rectal)  Fever/Chills  Chest pain/shortness of breath                       Additional instructions: We found no colon cancer but removed one small polyp.   Unfortunately we did not cleanse the colon enough to allow me

## 2023-05-16 NOTE — ANESTHESIA PRE PROCEDURE
Department of Anesthesiology  Preprocedure Note       Name:  Mray Moreno   Age:  62 y.o.  :  1965                                          MRN:  [id number]         Date:  2023      Surgeon: Alley Castillo):  Kiran Michaels MD    Procedure: Procedure(s):  COLORECTAL CANCER SCREENING, NOT HIGH RISK    Medications prior to admission:   Prior to Admission medications    Medication Sig Start Date End Date Taking? Authorizing Provider   amLODIPine (NORVASC) 10 MG tablet Take 1 tablet by mouth daily   Yes Historical Provider, MD   predniSONE (DELTASONE) 20 MG tablet Take 1 tablet by mouth daily   Yes Historical Provider, MD   naproxen (NAPROSYN) 375 MG tablet Take 1 tablet by mouth 2 times daily (with meals)   Yes Historical Provider, MD   allopurinol (ZYLOPRIM) 100 MG tablet Take 1 tablet by mouth daily   Yes Historical Provider, MD   acetaminophen (TYLENOL) 500 MG tablet Take 1 tablet by mouth every 6 hours as needed for Pain   Yes Historical Provider, MD       Current medications:    Current Facility-Administered Medications   Medication Dose Route Frequency Provider Last Rate Last Admin    0.9 % sodium chloride infusion   IntraVENous Continuous Kiran Michaels MD        sodium chloride flush 0.9 % injection 5-40 mL  5-40 mL IntraVENous PRN Kiran Michaels MD           Allergies: Allergies   Allergen Reactions    Lisinopril Itching and Cough       Problem List:  There is no problem list on file for this patient. Past Medical History:        Diagnosis Date    Hypertension        Past Surgical History:        Procedure Laterality Date    APPENDECTOMY         Social History:    Social History     Tobacco Use    Smoking status: Every Day     Packs/day: 1.00     Types: Cigarettes    Smokeless tobacco: Never   Substance Use Topics    Alcohol use:  Yes                                Ready to quit: Not Answered  Counseling given: Not Answered      Vital Signs (Current):   Vitals:

## 2023-05-22 RX ORDER — LISINOPRIL 10 MG/1
20 TABLET ORAL DAILY
COMMUNITY

## 2023-05-22 RX ORDER — ALBUTEROL SULFATE 90 UG/1
4-6 AEROSOL, METERED RESPIRATORY (INHALATION) EVERY 4 HOURS PRN
COMMUNITY
Start: 2021-11-23

## 2023-05-22 RX ORDER — AZITHROMYCIN 250 MG/1
TABLET, FILM COATED ORAL
COMMUNITY
Start: 2013-03-05

## 2023-05-22 RX ORDER — HYDROCHLOROTHIAZIDE 25 MG/1
TABLET ORAL DAILY
COMMUNITY

## 2023-06-16 ENCOUNTER — HOSPITAL ENCOUNTER (OUTPATIENT)
Facility: HOSPITAL | Age: 58
Discharge: HOME | End: 2023-06-19
Payer: MEDICARE

## 2023-06-16 VITALS — WEIGHT: 267 LBS | BODY MASS INDEX: 38.31 KG/M2

## 2023-06-16 DIAGNOSIS — M54.17 RADICULOPATHY OF LUMBOSACRAL REGION: ICD-10-CM

## 2023-06-16 PROCEDURE — 72148 MRI LUMBAR SPINE W/O DYE: CPT

## 2024-02-13 ENCOUNTER — ANESTHESIA EVENT (OUTPATIENT)
Facility: HOSPITAL | Age: 59
End: 2024-02-13
Payer: MEDICARE

## 2024-02-13 ENCOUNTER — HOSPITAL ENCOUNTER (OUTPATIENT)
Facility: HOSPITAL | Age: 59
Setting detail: OUTPATIENT SURGERY
Discharge: HOME | End: 2024-02-13
Attending: SPECIALIST | Admitting: SPECIALIST
Payer: MEDICARE

## 2024-02-13 ENCOUNTER — ANESTHESIA (OUTPATIENT)
Facility: HOSPITAL | Age: 59
End: 2024-02-13
Payer: MEDICARE

## 2024-02-13 VITALS
BODY MASS INDEX: 34.5 KG/M2 | TEMPERATURE: 98.5 F | DIASTOLIC BLOOD PRESSURE: 94 MMHG | HEART RATE: 77 BPM | RESPIRATION RATE: 15 BRPM | SYSTOLIC BLOOD PRESSURE: 147 MMHG | HEIGHT: 70 IN | WEIGHT: 241 LBS | OXYGEN SATURATION: 95 %

## 2024-02-13 PROCEDURE — 88305 TISSUE EXAM BY PATHOLOGIST: CPT

## 2024-02-13 PROCEDURE — 2580000003 HC RX 258: Performed by: SPECIALIST

## 2024-02-13 PROCEDURE — 6360000002 HC RX W HCPCS: Performed by: NURSE ANESTHETIST, CERTIFIED REGISTERED

## 2024-02-13 PROCEDURE — 7100000011 HC PHASE II RECOVERY - ADDTL 15 MIN: Performed by: SPECIALIST

## 2024-02-13 PROCEDURE — 7100000010 HC PHASE II RECOVERY - FIRST 15 MIN: Performed by: SPECIALIST

## 2024-02-13 PROCEDURE — 3600007512: Performed by: SPECIALIST

## 2024-02-13 PROCEDURE — 3700000001 HC ADD 15 MINUTES (ANESTHESIA): Performed by: SPECIALIST

## 2024-02-13 PROCEDURE — 3600007502: Performed by: SPECIALIST

## 2024-02-13 PROCEDURE — 3700000000 HC ANESTHESIA ATTENDED CARE: Performed by: SPECIALIST

## 2024-02-13 PROCEDURE — 2709999900 HC NON-CHARGEABLE SUPPLY: Performed by: SPECIALIST

## 2024-02-13 RX ORDER — SIMETHICONE 20 MG/.3ML
40 EMULSION ORAL
Status: DISCONTINUED | OUTPATIENT
Start: 2024-02-13 | End: 2024-02-13 | Stop reason: HOSPADM

## 2024-02-13 RX ORDER — LIDOCAINE HCL/PF 100 MG/5ML
SYRINGE (ML) INJECTION PRN
Status: DISCONTINUED | OUTPATIENT
Start: 2024-02-13 | End: 2024-02-13 | Stop reason: SDUPTHER

## 2024-02-13 RX ORDER — LOSARTAN POTASSIUM 25 MG/1
25 TABLET ORAL DAILY
COMMUNITY

## 2024-02-13 RX ORDER — PROPOFOL 10 MG/ML
INJECTION, EMULSION INTRAVENOUS PRN
Status: DISCONTINUED | OUTPATIENT
Start: 2024-02-13 | End: 2024-02-13 | Stop reason: SDUPTHER

## 2024-02-13 RX ORDER — SODIUM CHLORIDE 9 MG/ML
INJECTION, SOLUTION INTRAVENOUS CONTINUOUS
Status: DISCONTINUED | OUTPATIENT
Start: 2024-02-13 | End: 2024-02-13 | Stop reason: HOSPADM

## 2024-02-13 RX ORDER — SODIUM CHLORIDE 0.9 % (FLUSH) 0.9 %
5-40 SYRINGE (ML) INJECTION PRN
Status: DISCONTINUED | OUTPATIENT
Start: 2024-02-13 | End: 2024-02-13 | Stop reason: HOSPADM

## 2024-02-13 RX ADMIN — SODIUM CHLORIDE: 9 INJECTION, SOLUTION INTRAVENOUS at 11:15

## 2024-02-13 RX ADMIN — PROPOFOL 50 MG: 10 INJECTION, EMULSION INTRAVENOUS at 11:24

## 2024-02-13 RX ADMIN — PROPOFOL 150 MCG/KG/MIN: 10 INJECTION, EMULSION INTRAVENOUS at 11:20

## 2024-02-13 RX ADMIN — LIDOCAINE HYDROCHLORIDE 20 MG: 20 INJECTION INTRAVENOUS at 11:19

## 2024-02-13 RX ADMIN — PROPOFOL 50 MG: 10 INJECTION, EMULSION INTRAVENOUS at 11:19

## 2024-02-13 RX ADMIN — PROPOFOL 50 MG: 10 INJECTION, EMULSION INTRAVENOUS at 11:33

## 2024-02-13 NOTE — H&P
Pre-Endoscopy H&P Update  Chief complaint/HPI/ROS:  The indication for the procedure, the patient's history and the patient's current medications are reviewed prior to the procedure and that data is reported on the H&P to which this document is attached.  Any significant complaints with regard to organ systems will be noted, and if not mentioned then a review of systems is not contributory.  Past Medical History:   Diagnosis Date    Herniated lumbar intervertebral disc     L 4-5    Hypertension       Past Surgical History:   Procedure Laterality Date    APPENDECTOMY      COLONOSCOPY N/A 5/16/2023    COLONOSCOPY DIAGNOSTIC performed by Andry Escalera MD at [facility] ENDOSCOPY    COLONOSCOPY N/A 5/16/2023    COLONOSCOPY POLYPECTOMY SNARE/COLD BIOPSY performed by Andry Escalera MD at [facility] ENDOSCOPY     Social   Social History     Tobacco Use    Smoking status: Every Day     Current packs/day: 1.00     Types: Cigarettes    Smokeless tobacco: Never   Substance Use Topics    Alcohol use: Not Currently      History reviewed. No pertinent family history.   Allergies   Allergen Reactions    Lisinopril Itching and Cough      Prior to Admission Medications   Prescriptions Last Dose Informant Patient Reported? Taking?   acetaminophen (TYLENOL) 500 MG tablet Past Week  Yes No   Sig: Take 1 tablet by mouth every 6 hours as needed for Pain   albuterol sulfate HFA (PROVENTIL;VENTOLIN;PROAIR) 108 (90 Base) MCG/ACT inhaler Unknown  Yes No   Sig: Inhale 4-6 puffs into the lungs every 4 hours as needed   allopurinol (ZYLOPRIM) 100 MG tablet Past Week  Yes No   Sig: Take 1 tablet by mouth daily   amLODIPine (NORVASC) 10 MG tablet   Yes No   Sig: Take 1 tablet by mouth daily   azithromycin (ZITHROMAX) 250 MG tablet Not Taking  Yes No   Sig: Take two tablets today then one tablet daily   Patient not taking: Reported on 2/13/2024   hydroCHLOROthiazide (HYDRODIURIL) 25 MG tablet   Yes No   Sig: Take by mouth daily   lisinopril

## 2024-02-13 NOTE — PROGRESS NOTES
Endoscopy discharge instructions have been reviewed and given to patient.  The patient verbalized understanding and acceptance of instructions.      Dr. Escalera discussed with patient procedure findings and next steps.

## 2024-02-13 NOTE — PERIOP NOTE
Endoscope was pre-cleaned at bedside immediately following procedure by Katherin. Assumed pt care taken to recovery via stretcher for further monitoring please see CRNA chart sedation/monitoring for procedures pt tolerated well.

## 2024-02-13 NOTE — OP NOTE
[facility]  [facility]  Andry Escalera MD  [phone]      2024    Colonoscopy Procedure Note  Tristan Granados  :  1965  [place] Medical Record Number: [id number]    Indications:     Screening colonoscopy  PCP:  Abraham Dixon Jr., MD  Anesthesia/Sedation: Conscious Sedation/Moderate Sedation/GETA, see notes  Endoscopist:  Dr. Andry Escalera  Complications:  None  Estimated Blood Loss:  None    Permit:  The indications, risks, benefits and alternatives were reviewed with the patient or their decision maker who was provided an opportunity to ask questions and all questions were answered.  The specific risks of colonoscopy with conscious sedation were reviewed, including but not limited to anesthetic complication, bleeding, adverse drug reaction, missed lesion, infection, IV site reactions, and intestinal perforation which would lead to the need for surgical repair.  Alternatives to colonoscopy including radiographic imaging, observation without testing, or laboratory testing were reviewed including the limitations of those alternatives.  After considering the options and having all their questions answered, the patient or their decision maker provided both verbal and written consent to proceed.        Procedure in Detail:  After obtaining informed consent, positioning of the patient in the left lateral decubitus position, and conduction of a pre-procedure pause or \"time out\" the endoscope was introduced into the anus and advanced to the cecum, which was identified by the ileocecal valve and appendiceal orifice.  The quality of the colonic preparation was good.  A careful inspection was made as the colonoscope was withdrawn, findings and interventions are described below.    Findings:   5mm descending colon polyp removed with cold snare, retrieved, and hemostasis confirmed.  There is diverticulosis in the

## 2024-02-13 NOTE — ANESTHESIA POSTPROCEDURE EVALUATION
Department of Anesthesiology  Postprocedure Note    Patient: Tristan Granados  MRN: [id number]  YOB: 1965  Date of evaluation: 2/13/2024    Procedure Summary       Date: 02/13/24 Room / Location: [place] [address] / [facility] ENDOSCOPY    Anesthesia Start: 1115 Anesthesia Stop: 1140    Procedures:       COLONOSCOPY (Lower GI Region)      COLONOSCOPY POLYPECTOMY REMOVAL SNARE/STOMA (Lower GI Region) Diagnosis:       Personal history of colonic polyps      FH: colonic polyps      (Personal history of colonic polyps [Z86.010])      (FH: colonic polyps [Z83.719])    Surgeons: Andry Escalera MD Responsible Provider: Corky Infante MD    Anesthesia Type: MAC ASA Status: 2            Anesthesia Type: MAC    Mi Phase I: Mi Score: 10    Mi Phase II: Mi Score: 10    Anesthesia Post Evaluation    Patient location during evaluation: PACU  Patient participation: complete - patient participated  Level of consciousness: awake and alert  Pain score: 1  Airway patency: patent  Nausea & Vomiting: no nausea and no vomiting  Cardiovascular status: blood pressure returned to baseline  Respiratory status: acceptable  Hydration status: euvolemic  Pain management: adequate    No notable events documented.

## 2024-02-13 NOTE — PROGRESS NOTES
Tristan Granados  1965  [id number]    Situation:  Verbal report received from: SANDRA Jensen   Procedure: Procedure(s):  COLONOSCOPY  COLONOSCOPY POLYPECTOMY REMOVAL SNARE/STOMA    Background:    Preoperative diagnosis: Personal history of colonic polyps [Z86.010]  FH: colonic polyps [Z83.719]  Postoperative diagnosis: * No post-op diagnosis entered *    :  Dr. Escalera   Assistant(s): Circulator: Camila Urban RN  Endoscopy Technician: Katherin Amin    Specimens:   ID Type Source Tests Collected by Time Destination   1 : polyp Tissue Colon-Descending SURGICAL PATHOLOGY Andry Escalera MD 2/13/2024 1136      H. Pylori    no    Assessment:  Intra-procedure medications     Anesthesia gave intra-procedure sedation and medications, see anesthesia flow sheet   yes    Intravenous fluids: NS@ KVO     Vital signs stable   yes    Abdominal assessment: round and soft   yes    Recommendation:  Discharge patient per MD order  yes.  Return to floor  outpatient  Family or Friend   friend  Permission to share finding with family or friend   yes

## 2024-02-13 NOTE — H&P
58 y.o. male for open access colonoscopy for screening   Additional data for completion of the targeted pre-endoscopy H&P will be provided under 'H&P interval notes'.  Please see that document which will be attached to this.  Andry Escalera MD    Attempted colonoscopy May 2023 small hyperplastic  polyp but poor prep so I asked for repeat exam with extended prep.

## 2024-02-13 NOTE — DISCHARGE INSTRUCTIONS
[facility]  [facility]  Andry Gunn MD  [phone]      February 13, 2024    Tristan Granados  YOB: 1965    COLONOSCOPY DISCHARGE INSTRUCTIONS    If there is redness at IV site you should apply warm compress to area.  If redness or soreness persist contact Dr. Gunn' or your primary care doctor.    There may be a slight amount of blood passed from the rectum.  Gaseous discomfort may develop, but walking, belching will help relieve this.  You may not operate a vehicle for 12 hours  You may not operate machinery or dangerous appliances for rest of today  You may not drink alcoholic beverages for 12 hours  Avoid making any critical decisions for 24 hours    DIET:  You may resume your normal diet, but some patients find that heavy or large meals may lead to indigestion or vomiting.  I suggest a light meal as first food intake.    MEDICATIONS:  The use of some over-the-counter pain medication may lead to bleeding after colon biopsies or polyp removal.  Tylenol (also called acetaminophen) is safe to take even if you have had colonoscopy with polyp removal.  Based on the procedure you had today you may not safely take aspirin or aspirin-like products for the next ten (10) days.  Remember that Tylenol (also called acetaminophen) is safe to take after colonoscopy even if you have had biopsies or polyps removed.    ACTIVITY:  You may resume your normal household activities, but it is recommended that you spend the remainder of the day resting -  avoid any strenuous activity.    CALL DR. GUNN' OFFICE IF:  Increasing pain, nausea, vomiting  Abdominal distension (swelling)  Significant new or increased bleeding (oral or rectal)  Fever/Chills  Chest pain/shortness of breath                       Additional instructions:   Great news, we got a good look and found no serious problems but did remove one additional polyp.  I'll contact

## 2024-09-26 ENCOUNTER — TELEPHONE (OUTPATIENT)
Dept: PRIMARY CARE CLINIC | Facility: CLINIC | Age: 59
End: 2024-09-26

## 2024-11-06 ENCOUNTER — HOSPITAL ENCOUNTER (OUTPATIENT)
Age: 59
Discharge: HOME | End: 2024-11-09
Payer: COMMERCIAL

## 2024-11-06 DIAGNOSIS — M54.17 RADICULOPATHY, LUMBOSACRAL REGION: ICD-10-CM

## 2024-11-06 PROCEDURE — 72148 MRI LUMBAR SPINE W/O DYE: CPT

## 2025-04-11 ENCOUNTER — HOSPITAL ENCOUNTER (EMERGENCY)
Facility: HOSPITAL | Age: 60
Discharge: HOME | End: 2025-04-11
Attending: STUDENT IN AN ORGANIZED HEALTH CARE EDUCATION/TRAINING PROGRAM
Payer: MEDICARE

## 2025-04-11 VITALS
HEIGHT: 70 IN | OXYGEN SATURATION: 90 % | SYSTOLIC BLOOD PRESSURE: 207 MMHG | BODY MASS INDEX: 35.22 KG/M2 | TEMPERATURE: 97.5 F | DIASTOLIC BLOOD PRESSURE: 116 MMHG | WEIGHT: 246 LBS | RESPIRATION RATE: 18 BRPM | HEART RATE: 91 BPM

## 2025-04-11 DIAGNOSIS — R05.1 ACUTE COUGH: ICD-10-CM

## 2025-04-11 DIAGNOSIS — I10 ASYMPTOMATIC HYPERTENSION: Primary | ICD-10-CM

## 2025-04-11 LAB
FLUAV RNA SPEC QL NAA+PROBE: NOT DETECTED
FLUBV RNA SPEC QL NAA+PROBE: NOT DETECTED
SARS-COV-2 RNA RESP QL NAA+PROBE: NOT DETECTED
SOURCE: NORMAL

## 2025-04-11 PROCEDURE — 6370000000 HC RX 637 (ALT 250 FOR IP): Performed by: STUDENT IN AN ORGANIZED HEALTH CARE EDUCATION/TRAINING PROGRAM

## 2025-04-11 PROCEDURE — 87636 SARSCOV2 & INF A&B AMP PRB: CPT

## 2025-04-11 PROCEDURE — 93005 ELECTROCARDIOGRAM TRACING: CPT | Performed by: STUDENT IN AN ORGANIZED HEALTH CARE EDUCATION/TRAINING PROGRAM

## 2025-04-11 PROCEDURE — 99284 EMERGENCY DEPT VISIT MOD MDM: CPT

## 2025-04-11 RX ORDER — CETIRIZINE HYDROCHLORIDE 10 MG/1
10 TABLET ORAL DAILY
Status: DISCONTINUED | OUTPATIENT
Start: 2025-04-12 | End: 2025-04-12 | Stop reason: HOSPADM

## 2025-04-11 RX ORDER — BENZONATATE 100 MG/1
100 CAPSULE ORAL 3 TIMES DAILY PRN
Status: DISCONTINUED | OUTPATIENT
Start: 2025-04-11 | End: 2025-04-12 | Stop reason: HOSPADM

## 2025-04-11 RX ORDER — LOSARTAN POTASSIUM 25 MG/1
25 TABLET ORAL ONCE
Status: COMPLETED | OUTPATIENT
Start: 2025-04-11 | End: 2025-04-11

## 2025-04-11 RX ORDER — FLUTICASONE PROPIONATE 50 MCG
2 SPRAY, SUSPENSION (ML) NASAL DAILY
Qty: 16 G | Refills: 0 | Status: SHIPPED | OUTPATIENT
Start: 2025-04-11

## 2025-04-11 RX ORDER — CETIRIZINE HYDROCHLORIDE 10 MG/1
10 TABLET ORAL DAILY
Qty: 30 TABLET | Refills: 0 | Status: SHIPPED | OUTPATIENT
Start: 2025-04-11

## 2025-04-11 RX ORDER — AMLODIPINE BESYLATE 5 MG/1
10 TABLET ORAL ONCE
Status: COMPLETED | OUTPATIENT
Start: 2025-04-11 | End: 2025-04-11

## 2025-04-11 RX ORDER — LOSARTAN POTASSIUM 25 MG/1
50 TABLET ORAL DAILY
Qty: 60 TABLET | Refills: 0 | Status: SHIPPED | OUTPATIENT
Start: 2025-04-11

## 2025-04-11 RX ORDER — AMLODIPINE BESYLATE 10 MG/1
10 TABLET ORAL DAILY
Qty: 30 TABLET | Refills: 0 | Status: SHIPPED | OUTPATIENT
Start: 2025-04-11

## 2025-04-11 RX ADMIN — AMLODIPINE BESYLATE 10 MG: 5 TABLET ORAL at 22:18

## 2025-04-11 RX ADMIN — LOSARTAN POTASSIUM 25 MG: 25 TABLET, FILM COATED ORAL at 22:18

## 2025-04-11 ASSESSMENT — LIFESTYLE VARIABLES
HOW OFTEN DO YOU HAVE A DRINK CONTAINING ALCOHOL: NEVER
HOW MANY STANDARD DRINKS CONTAINING ALCOHOL DO YOU HAVE ON A TYPICAL DAY: PATIENT DOES NOT DRINK

## 2025-04-11 ASSESSMENT — PAIN - FUNCTIONAL ASSESSMENT: PAIN_FUNCTIONAL_ASSESSMENT: NONE - DENIES PAIN

## 2025-04-12 LAB
EKG ATRIAL RATE: 68 BPM
EKG DIAGNOSIS: NORMAL
EKG P AXIS: 66 DEGREES
EKG P-R INTERVAL: 160 MS
EKG Q-T INTERVAL: 394 MS
EKG QRS DURATION: 92 MS
EKG QTC CALCULATION (BAZETT): 418 MS
EKG R AXIS: -8 DEGREES
EKG T AXIS: 83 DEGREES
EKG VENTRICULAR RATE: 68 BPM

## 2025-04-12 NOTE — DISCHARGE INSTRUCTIONS
Please call to schedule follow up with your primary care as we discussed. Take your blood pressure medications as we discussed and keep a log of your blood pressure at home to take to your primary care appointment. Please return to the emergency department if you experience chest pain, shortness of breath, sudden worsening of your vision or eye pain, severe headache that persists after tylenol or ibuprofen, neck stiffness, fainting, muscle weakness, numbness or tingling, or other new and concerning symptom.

## 2025-04-12 NOTE — ED TRIAGE NOTES
Pt ambulatory to triage for elevated BP after [facility]. [facility] for non productive cough and congestion. PT states he take carvedilol but is uncertain of dose and losartan. Pt requesting flu/covid testing.

## 2025-04-14 NOTE — ED PROVIDER NOTES
headaches.  - Hyperaldosteronism: Considered because of the patient's hypertension, but unlikely due to the absence of symptoms such as muscle weakness.    DIAGNOSIS:  Primary Diagnosis:  - Hypertension    ED Triage Vitals [04/11/25 2001]   BP Systolic BP Percentile Diastolic BP Percentile Temp Temp Source Pulse Respirations SpO2   (!) 205/133 -- -- 97.5 °F (36.4 °C) Tympanic 91 18 94 %      Height Weight - Scale         1.778 m (5' 10\") 111.6 kg (246 lb)             CURRENT MEDICATIONS       Discharge Medication List as of 4/11/2025 10:51 PM        CONTINUE these medications which have NOT CHANGED    Details   albuterol sulfate HFA (PROVENTIL;VENTOLIN;PROAIR) 108 (90 Base) MCG/ACT inhaler Inhale 4-6 puffs into the lungs every 4 hours as neededHistorical Med      azithromycin (ZITHROMAX) 250 MG tablet Take two tablets today then one tablet dailyHistorical Med      hydroCHLOROthiazide (HYDRODIURIL) 25 MG tablet Take by mouth dailyHistorical Med      predniSONE (DELTASONE) 20 MG tablet Take 1 tablet by mouth dailyHistorical Med      naproxen (NAPROSYN) 375 MG tablet Take 1 tablet by mouth 2 times daily (with meals)Historical Med      allopurinol (ZYLOPRIM) 100 MG tablet Take 1 tablet by mouth dailyHistorical Med      acetaminophen (TYLENOL) 500 MG tablet Take 1 tablet by mouth every 6 hours as needed for PainHistorical Med             REASSESSMENT     Vitals:    04/11/25 2001 04/11/25 2130 04/11/25 2218 04/11/25 2322   BP: (!) 205/133 (!) 211/124 (!) 191/114 (!) 207/116   Pulse: 91      Resp: 18      Temp: 97.5 °F (36.4 °C)      TempSrc: Tympanic      SpO2: 94% 94%  90%   Weight: 111.6 kg (246 lb)      Height: 1.778 m (5' 10\")        ED Course as of 04/13/25 2043 Fri Apr 11, 2025 2142 EKG documented and interpreted by Lynn Abebe MD as: Normal sinus rhythm, HR approximately 68, regular rate, regular intervals, normal axis, no ST segment elevation, nonspecific ST changes   [LT]      ED Course User

## 2025-04-22 ENCOUNTER — HOSPITAL ENCOUNTER (OUTPATIENT)
Age: 60
Discharge: HOME | End: 2025-04-25
Payer: COMMERCIAL

## 2025-04-22 DIAGNOSIS — M54.9 DORSALGIA: ICD-10-CM

## 2025-04-22 DIAGNOSIS — M79.605 LEFT LEG PAIN: ICD-10-CM

## 2025-04-22 DIAGNOSIS — M51.362 DEGENERATION OF INTERVERTEBRAL DISC OF LUMBAR REGION WITH DISCOGENIC BACK PAIN AND LOWER EXTREMITY PAIN: ICD-10-CM

## 2025-04-22 DIAGNOSIS — R29.898 WEAKNESS OF LEFT LOWER EXTREMITY: ICD-10-CM

## 2025-04-22 PROCEDURE — 72148 MRI LUMBAR SPINE W/O DYE: CPT

## 2025-04-24 ENCOUNTER — HOSPITAL ENCOUNTER (OUTPATIENT)
Facility: HOSPITAL | Age: 60
Discharge: HOME | End: 2025-04-27
Payer: COMMERCIAL

## 2025-04-24 DIAGNOSIS — M51.362 DEGENERATION OF INTERVERTEBRAL DISC OF LUMBAR REGION WITH DISCOGENIC BACK PAIN AND LOWER EXTREMITY PAIN: ICD-10-CM

## 2025-04-24 DIAGNOSIS — M54.9 DORSALGIA: ICD-10-CM

## 2025-04-24 PROCEDURE — 72082 X-RAY EXAM ENTIRE SPI 2/3 VW: CPT

## (undated) DEVICE — CONTAINER SPEC 20 ML LID NEUT BUFF FORMALIN 10 % POLYPR STS

## (undated) DEVICE — SNARE ENDOSCP POLYP MED STD AD 2.4X27X240 CM 2.8 MM OVL SENS